# Patient Record
Sex: MALE | Race: ASIAN | NOT HISPANIC OR LATINO | URBAN - METROPOLITAN AREA
[De-identification: names, ages, dates, MRNs, and addresses within clinical notes are randomized per-mention and may not be internally consistent; named-entity substitution may affect disease eponyms.]

---

## 2022-04-21 ENCOUNTER — EMERGENCY (EMERGENCY)
Facility: HOSPITAL | Age: 22
LOS: 1 days | Discharge: HOME | End: 2022-04-21
Attending: EMERGENCY MEDICINE | Admitting: EMERGENCY MEDICINE
Payer: MEDICAID

## 2022-04-21 VITALS
DIASTOLIC BLOOD PRESSURE: 80 MMHG | RESPIRATION RATE: 17 BRPM | HEIGHT: 67 IN | WEIGHT: 143.3 LBS | TEMPERATURE: 98 F | OXYGEN SATURATION: 97 % | SYSTOLIC BLOOD PRESSURE: 121 MMHG | HEART RATE: 120 BPM

## 2022-04-21 PROBLEM — Z00.00 ENCOUNTER FOR PREVENTIVE HEALTH EXAMINATION: Status: ACTIVE | Noted: 2022-04-21

## 2022-04-21 PROCEDURE — 99284 EMERGENCY DEPT VISIT MOD MDM: CPT | Mod: 25

## 2022-04-21 PROCEDURE — 93010 ELECTROCARDIOGRAM REPORT: CPT

## 2022-04-21 RX ORDER — VALACYCLOVIR 500 MG/1
1 TABLET, FILM COATED ORAL
Qty: 21 | Refills: 0
Start: 2022-04-21 | End: 2022-04-27

## 2022-04-21 RX ORDER — VALACYCLOVIR 500 MG/1
1000 TABLET, FILM COATED ORAL ONCE
Refills: 0 | Status: COMPLETED | OUTPATIENT
Start: 2022-04-21 | End: 2022-04-21

## 2022-04-21 RX ADMIN — VALACYCLOVIR 1000 MILLIGRAM(S): 500 TABLET, FILM COATED ORAL at 14:17

## 2022-04-21 RX ADMIN — Medication 60 MILLIGRAM(S): at 14:18

## 2022-04-21 NOTE — ED PROVIDER NOTE - PATIENT PORTAL LINK FT
You can access the FollowMyHealth Patient Portal offered by [facility] by registering at the following website: [email or web address]. By joining [facility]’s FollowMyHealth portal, you will also be able to view your health information using other applications (apps) compatible with our system.

## 2022-04-21 NOTE — ED PROVIDER NOTE - CARE PROVIDER_API CALL
Mike Gómez)  Neurology; Neuromuscular Medicine  [address], [address] [place] [address]  Phone: [phone]  Fax: [phone]  Follow Up Time:

## 2022-04-21 NOTE — ED ADULT NURSE NOTE - OBJECTIVE STATEMENT
awoke with left side facial numbness and droop at 1100, no other neuro deficits noted, no s/s of distress, awaiting provider eval

## 2022-04-21 NOTE — ED PROVIDER NOTE - OBJECTIVE STATEMENT
22 yo male pt, no hx of med problems, nkda. Presents for L sided facial weakness and paresthesia he noted when he woke up at 11 am today. no similar symptoms in the past. no focal weakness in arms/legs, no blurred vision, no Headache, no neck pain. no hx of lyme, no hx of tick bites or hx of travel to lyme endemic areas.

## 2022-04-23 DIAGNOSIS — R53.1 WEAKNESS: ICD-10-CM

## 2022-04-23 DIAGNOSIS — R00.0 TACHYCARDIA, UNSPECIFIED: ICD-10-CM

## 2022-04-23 DIAGNOSIS — G83.9 PARALYTIC SYNDROME, UNSPECIFIED: ICD-10-CM

## 2022-12-22 NOTE — ED ADULT NURSE NOTE - CAS TRG GENERAL NORM CIRC DET
Bactrim Counseling:  I discussed with the patient the risks of sulfa antibiotics including but not limited to GI upset, allergic reaction, drug rash, diarrhea, dizziness, photosensitivity, and yeast infections.  Rarely, more serious reactions can occur including but not limited to aplastic anemia, agranulocytosis, methemoglobinemia, blood dyscrasias, liver or kidney failure, lung infiltrates or desquamative/blistering drug rashes. Strong peripheral pulses/Capillary refill less/equal to 2 seconds

## 2023-10-17 NOTE — ED PROVIDER NOTE - NS ED MD DISPO DISCHARGE
Home Sarecycline Counseling: Patient advised regarding possible photosensitivity and discoloration of the teeth, skin, lips, tongue and gums.  Patient instructed to avoid sunlight, if possible.  When exposed to sunlight, patients should wear protective clothing, sunglasses, and sunscreen.  The patient was instructed to call the office immediately if the following severe adverse effects occur:  hearing changes, easy bruising/bleeding, severe headache, or vision changes.  The patient verbalized understanding of the proper use and possible adverse effects of sarecycline.  All of the patient's questions and concerns were addressed.

## 2024-01-22 NOTE — ED ADULT NURSE NOTE - CCCP TRG CHIEF CMPLNT
facial numbness Detail Level: Detailed Quality 226: Preventive Care And Screening: Tobacco Use: Screening And Cessation Intervention: Patient screened for tobacco use and is an ex/non-smoker